# Patient Record
Sex: FEMALE | Race: WHITE | NOT HISPANIC OR LATINO | URBAN - METROPOLITAN AREA
[De-identification: names, ages, dates, MRNs, and addresses within clinical notes are randomized per-mention and may not be internally consistent; named-entity substitution may affect disease eponyms.]

---

## 2020-07-25 ENCOUNTER — TELEPHONE ENCOUNTER (OUTPATIENT)
Dept: URBAN - METROPOLITAN AREA CLINIC 13 | Facility: CLINIC | Age: 60
End: 2020-07-25

## 2020-07-26 ENCOUNTER — TELEPHONE ENCOUNTER (OUTPATIENT)
Dept: URBAN - METROPOLITAN AREA CLINIC 13 | Facility: CLINIC | Age: 60
End: 2020-07-26

## 2020-07-26 RX ORDER — ALPRAZOLAM 0.25 MG/1
TABLET ORAL
Qty: 60 | Refills: 0 | Status: ACTIVE | COMMUNITY
Start: 2018-09-12

## 2020-07-26 RX ORDER — HYDROCODONE BITARTRATE AND ACETAMINOPHEN 5; 325 MG/1; MG/1
TABLET ORAL
Qty: 25 | Refills: 0 | Status: ACTIVE | COMMUNITY
Start: 2018-12-12

## 2020-07-26 RX ORDER — ACETAMINOPHEN AND PHENYLEPHRINE HYDROCHLORIDE 325; 5 MG/1; MG/1
TAKE 1 TABLET DAILY TABLET, COATED ORAL
Refills: 0 | Status: ACTIVE | COMMUNITY

## 2020-07-26 RX ORDER — ALPRAZOLAM 0.25 MG/1
TAKE 1 TABLET DAILY TABLET ORAL
Refills: 0 | Status: ACTIVE | COMMUNITY

## 2020-07-26 RX ORDER — ONDANSETRON 4 MG/1
TABLET, ORALLY DISINTEGRATING ORAL
Qty: 18 | Refills: 0 | Status: ACTIVE | COMMUNITY
Start: 2018-09-09

## 2020-07-26 RX ORDER — CELECOXIB 200 MG/1
TAKE 1 CAPSULE DAILY AS NEEDED CAPSULE ORAL
Refills: 0 | Status: ACTIVE | COMMUNITY

## 2020-07-26 RX ORDER — CELECOXIB 200 MG/1
CAPSULE ORAL
Qty: 90 | Refills: 0 | Status: ACTIVE | COMMUNITY
Start: 2018-09-24

## 2020-07-26 RX ORDER — CITALOPRAM 20 MG/1
TABLET ORAL
Qty: 90 | Refills: 0 | Status: ACTIVE | COMMUNITY
Start: 2019-06-20

## 2020-07-26 RX ORDER — CITALOPRAM 20 MG/1
TAKE 1 TABLET DAILY AS DIRECTED TABLET ORAL
Refills: 0 | Status: ACTIVE | COMMUNITY

## 2020-08-19 ENCOUNTER — OFFICE VISIT (OUTPATIENT)
Dept: URBAN - METROPOLITAN AREA CLINIC 113 | Facility: CLINIC | Age: 60
End: 2020-08-19
Payer: COMMERCIAL

## 2020-08-19 ENCOUNTER — LAB OUTSIDE AN ENCOUNTER (OUTPATIENT)
Dept: URBAN - METROPOLITAN AREA CLINIC 113 | Facility: CLINIC | Age: 60
End: 2020-08-19

## 2020-08-19 ENCOUNTER — WEB ENCOUNTER (OUTPATIENT)
Dept: URBAN - METROPOLITAN AREA CLINIC 113 | Facility: CLINIC | Age: 60
End: 2020-08-19

## 2020-08-19 VITALS
HEIGHT: 72 IN | TEMPERATURE: 98.1 F | WEIGHT: 246 LBS | DIASTOLIC BLOOD PRESSURE: 81 MMHG | RESPIRATION RATE: 18 BRPM | HEART RATE: 60 BPM | BODY MASS INDEX: 33.32 KG/M2 | SYSTOLIC BLOOD PRESSURE: 131 MMHG

## 2020-08-19 DIAGNOSIS — Z12.11 SCREEN FOR COLON CANCER: ICD-10-CM

## 2020-08-19 DIAGNOSIS — K59.01 SLOW TRANSIT CONSTIPATION: ICD-10-CM

## 2020-08-19 DIAGNOSIS — R19.4 CHANGE IN BOWEL HABIT: ICD-10-CM

## 2020-08-19 DIAGNOSIS — R14.0 ABDOMINAL BLOATING: ICD-10-CM

## 2020-08-19 PROCEDURE — 99214 OFFICE O/P EST MOD 30 MIN: CPT | Performed by: INTERNAL MEDICINE

## 2020-08-19 RX ORDER — ONDANSETRON 4 MG/1
TABLET, ORALLY DISINTEGRATING ORAL
Qty: 18 | Refills: 0 | Status: ACTIVE | COMMUNITY
Start: 2018-09-09

## 2020-08-19 RX ORDER — METFORMIN HYDROCHLORIDE 500 MG/1
1 TABLET WITH A MEAL TABLET, FILM COATED ORAL ONCE A DAY
Status: ACTIVE | COMMUNITY

## 2020-08-19 RX ORDER — HYDROCODONE BITARTRATE AND ACETAMINOPHEN 5; 325 MG/1; MG/1
TABLET ORAL
Qty: 25 | Refills: 0 | Status: DISCONTINUED | COMMUNITY
Start: 2018-12-12

## 2020-08-19 RX ORDER — CITALOPRAM 20 MG/1
TAKE 1 TABLET DAILY AS DIRECTED TABLET ORAL
Refills: 0 | Status: ACTIVE | COMMUNITY

## 2020-08-19 RX ORDER — SODIUM, POTASSIUM,MAG SULFATES 17.5-3.13G
354 ML SOLUTION, RECONSTITUTED, ORAL ORAL ONCE
Qty: 10 | Refills: 0 | OUTPATIENT
Start: 2020-08-19 | End: 2020-11-16

## 2020-08-19 RX ORDER — ACETAMINOPHEN AND PHENYLEPHRINE HYDROCHLORIDE 325; 5 MG/1; MG/1
TAKE 1 TABLET DAILY TABLET, COATED ORAL
Refills: 0 | Status: ACTIVE | COMMUNITY

## 2020-08-19 RX ORDER — ALPRAZOLAM 0.25 MG/1
TAKE 1 TABLET DAILY TABLET ORAL
Refills: 0 | Status: ACTIVE | COMMUNITY

## 2020-08-19 RX ORDER — SIMVASTATIN 40 MG/1
1 TABLET IN THE EVENING TABLET, FILM COATED ORAL ONCE A DAY
Status: ACTIVE | COMMUNITY

## 2020-08-19 RX ORDER — OMEPRAZOLE 40 MG/1
1 CAPSULE 30 MINUTES BEFORE MORNING MEAL CAPSULE, DELAYED RELEASE ORAL ONCE A DAY
Status: ACTIVE | COMMUNITY

## 2020-08-19 RX ORDER — CELECOXIB 200 MG/1
CAPSULE ORAL
Qty: 90 | Refills: 0 | Status: DISCONTINUED | COMMUNITY
Start: 2018-09-24

## 2020-08-19 RX ORDER — PLECANATIDE 3 MG/1
1 TABLET TABLET ORAL ONCE A DAY
Qty: 30 | Refills: 5 | OUTPATIENT
Start: 2020-08-19 | End: 2021-02-14

## 2020-08-19 RX ORDER — CELECOXIB 200 MG/1
TAKE 1 CAPSULE DAILY AS NEEDED CAPSULE ORAL
Refills: 0 | Status: ACTIVE | COMMUNITY

## 2020-08-19 NOTE — HPI-TODAY'S VISIT:
Ms. Santos is a 60 year old woman referred by Dr. Oscar for evaluation of acid reflux. She was last seen here as a new patient in August 2019.  She has been on daily PPI therapy for 20-30 years. She used to have voice changes, throat clearing and asthmatic attacks which she attributed to acid reflux. The symptoms have been quiescent on PPI therapy. She started having choking spells, reflux and heartburn in March or April of 2019.  These were episodic, lasting about 3 minutes each.    After her last visit, the patient had an upper GI endoscopy performed on September 19, 2019.  This revealed gastritis and gastric polyps.  The biopsies were negative for any significant pathology.  There are also negative for H. pylori.  Patient also had a barium swallow performed on September 17 2019 which was normal.      Previous studies EGD 2009 : Z-line at 36 cm, esophageal changes possibly Parson's (no intestinal metaplasia on bx), gastric biopsies negative for H pylori, and mild duodenitis. Bravo pH probe was qualitative positive demonstrating only postprandial reflux. Colonoscopy 2010 : good prep, normal TI, moderate nonbleeding internal hemorrhoids, normal perianal and digital rectal exams. Labs 5/10/19 : WBC 6.2, Hgb 12.9, MCV 91, plt 223, BUN 16, Cr 0.89, TB 0.5, ALP 70, AST 17, ALT 20, alb 4.3. Negative HCV Ab. After last visit, the patient had an upper GI endoscopy performed on September 19, 2019.  This revealed gastritis and gastric polyps.  The biopsies were negative for any significant pathology.  There are also negative for H. pylori.  Patient also had a barium swallow performed on September 17 2019 which was normal.

## 2020-08-24 ENCOUNTER — OFFICE VISIT (OUTPATIENT)
Dept: URBAN - METROPOLITAN AREA SURGERY CENTER 25 | Facility: SURGERY CENTER | Age: 60
End: 2020-08-24
Payer: COMMERCIAL

## 2020-08-24 DIAGNOSIS — Z12.11 COLON CANCER SCREENING: ICD-10-CM

## 2020-08-24 DIAGNOSIS — K64.0 FIRST DEGREE HEMORRHOIDS: ICD-10-CM

## 2020-08-24 DIAGNOSIS — K57.30 COLON, DIVERTICULOSIS: ICD-10-CM

## 2020-08-24 PROCEDURE — G0121 COLON CA SCRN NOT HI RSK IND: HCPCS | Performed by: INTERNAL MEDICINE

## 2020-08-24 RX ORDER — METFORMIN HYDROCHLORIDE 500 MG/1
1 TABLET WITH A MEAL TABLET, FILM COATED ORAL ONCE A DAY
Status: ACTIVE | COMMUNITY

## 2020-08-24 RX ORDER — ACETAMINOPHEN AND PHENYLEPHRINE HYDROCHLORIDE 325; 5 MG/1; MG/1
TAKE 1 TABLET DAILY TABLET, COATED ORAL
Refills: 0 | Status: ACTIVE | COMMUNITY

## 2020-08-24 RX ORDER — CITALOPRAM 20 MG/1
TAKE 1 TABLET DAILY AS DIRECTED TABLET ORAL
Refills: 0 | Status: ACTIVE | COMMUNITY

## 2020-08-24 RX ORDER — OMEPRAZOLE 40 MG/1
1 CAPSULE 30 MINUTES BEFORE MORNING MEAL CAPSULE, DELAYED RELEASE ORAL ONCE A DAY
Status: ACTIVE | COMMUNITY

## 2020-08-24 RX ORDER — SODIUM, POTASSIUM,MAG SULFATES 17.5-3.13G
354 ML SOLUTION, RECONSTITUTED, ORAL ORAL ONCE
Qty: 10 | Refills: 0 | Status: ACTIVE | COMMUNITY
Start: 2020-08-19 | End: 2020-11-16

## 2020-08-24 RX ORDER — ONDANSETRON 4 MG/1
TABLET, ORALLY DISINTEGRATING ORAL
Qty: 18 | Refills: 0 | Status: ACTIVE | COMMUNITY
Start: 2018-09-09

## 2020-08-24 RX ORDER — PLECANATIDE 3 MG/1
1 TABLET TABLET ORAL ONCE A DAY
Qty: 30 | Refills: 5 | Status: ACTIVE | COMMUNITY
Start: 2020-08-19 | End: 2021-02-14

## 2020-08-24 RX ORDER — SIMVASTATIN 40 MG/1
1 TABLET IN THE EVENING TABLET, FILM COATED ORAL ONCE A DAY
Status: ACTIVE | COMMUNITY

## 2020-08-24 RX ORDER — ALPRAZOLAM 0.25 MG/1
TAKE 1 TABLET DAILY TABLET ORAL
Refills: 0 | Status: ACTIVE | COMMUNITY

## 2020-08-24 RX ORDER — CELECOXIB 200 MG/1
TAKE 1 CAPSULE DAILY AS NEEDED CAPSULE ORAL
Refills: 0 | Status: ACTIVE | COMMUNITY

## 2020-09-01 ENCOUNTER — TELEPHONE ENCOUNTER (OUTPATIENT)
Dept: URBAN - METROPOLITAN AREA CLINIC 113 | Facility: CLINIC | Age: 60
End: 2020-09-01

## 2020-09-01 ENCOUNTER — WEB ENCOUNTER (OUTPATIENT)
Dept: URBAN - METROPOLITAN AREA CLINIC 113 | Facility: CLINIC | Age: 60
End: 2020-09-01

## 2020-09-01 RX ORDER — RIFAXIMIN 550 MG/1
1 TABLET TABLET ORAL TWICE A DAY
Qty: 60 TABLET | Refills: 2 | OUTPATIENT
Start: 2020-09-01 | End: 2020-11-29

## 2020-09-02 ENCOUNTER — WEB ENCOUNTER (OUTPATIENT)
Dept: URBAN - METROPOLITAN AREA SURGERY CENTER 25 | Facility: SURGERY CENTER | Age: 60
End: 2020-09-02

## 2020-11-04 ENCOUNTER — ERX REFILL RESPONSE (OUTPATIENT)
Age: 60
End: 2020-11-04

## 2020-11-04 RX ORDER — RIFAXIMIN 550 MG/1
TAKE 1 TABLET BY MOUTH TWICE DAILY TABLET ORAL
Qty: 60 | Refills: 0

## 2020-11-19 ENCOUNTER — OFFICE VISIT (OUTPATIENT)
Dept: URBAN - METROPOLITAN AREA CLINIC 113 | Facility: CLINIC | Age: 60
End: 2020-11-19
Payer: COMMERCIAL

## 2020-11-19 VITALS
BODY MASS INDEX: 33.86 KG/M2 | SYSTOLIC BLOOD PRESSURE: 130 MMHG | WEIGHT: 250 LBS | HEART RATE: 64 BPM | TEMPERATURE: 97.9 F | HEIGHT: 72 IN | DIASTOLIC BLOOD PRESSURE: 80 MMHG | RESPIRATION RATE: 18 BRPM

## 2020-11-19 DIAGNOSIS — R19.4 CHANGE IN BOWEL HABIT: ICD-10-CM

## 2020-11-19 DIAGNOSIS — K59.01 SLOW TRANSIT CONSTIPATION: ICD-10-CM

## 2020-11-19 DIAGNOSIS — K21.9 GASTROESOPHAGEAL REFLUX DISEASE, UNSPECIFIED WHETHER ESOPHAGITIS PRESENT: ICD-10-CM

## 2020-11-19 DIAGNOSIS — R14.0 ABDOMINAL BLOATING: ICD-10-CM

## 2020-11-19 DIAGNOSIS — Z12.11 SCREEN FOR COLON CANCER: ICD-10-CM

## 2020-11-19 PROCEDURE — G8482 FLU IMMUNIZE ORDER/ADMIN: HCPCS | Performed by: INTERNAL MEDICINE

## 2020-11-19 PROCEDURE — 3017F COLORECTAL CA SCREEN DOC REV: CPT | Performed by: INTERNAL MEDICINE

## 2020-11-19 PROCEDURE — 1036F TOBACCO NON-USER: CPT | Performed by: INTERNAL MEDICINE

## 2020-11-19 PROCEDURE — G8417 CALC BMI ABV UP PARAM F/U: HCPCS | Performed by: INTERNAL MEDICINE

## 2020-11-19 PROCEDURE — 99214 OFFICE O/P EST MOD 30 MIN: CPT | Performed by: INTERNAL MEDICINE

## 2020-11-19 RX ORDER — HYOSCYAMINE SULFATE 0.12 MG/1
1 TABLET UNDER THE TONGUE AND ALLOW TO DISSOLVE  AS NEEDED TABLET, ORALLY DISINTEGRATING ORAL THREE TIMES A DAY
Qty: 90 | Refills: 3 | OUTPATIENT

## 2020-11-19 RX ORDER — CHOLECALCIFEROL (VITAMIN D3) 125 MCG
2 CAPSULE CAPSULE ORAL ONCE A DAY
Status: ACTIVE | COMMUNITY

## 2020-11-19 RX ORDER — ALPRAZOLAM 0.25 MG/1
TAKE 1 TABLET DAILY TABLET ORAL
Refills: 0 | Status: ACTIVE | COMMUNITY

## 2020-11-19 RX ORDER — CITALOPRAM 20 MG/1
TAKE 1 TABLET DAILY AS DIRECTED TABLET ORAL
Refills: 0 | Status: ACTIVE | COMMUNITY

## 2020-11-19 RX ORDER — ACETAMINOPHEN AND PHENYLEPHRINE HYDROCHLORIDE 325; 5 MG/1; MG/1
TAKE 1 TABLET DAILY TABLET, COATED ORAL
Refills: 0 | Status: ACTIVE | COMMUNITY

## 2020-11-19 RX ORDER — OMEPRAZOLE 40 MG/1
1 CAPSULE 30 MINUTES BEFORE MORNING MEAL CAPSULE, DELAYED RELEASE ORAL ONCE A DAY
Status: ACTIVE | COMMUNITY

## 2020-11-19 RX ORDER — SIMVASTATIN 40 MG/1
1 TABLET IN THE EVENING TABLET, FILM COATED ORAL ONCE A DAY
Status: ACTIVE | COMMUNITY

## 2020-11-19 RX ORDER — RIFAXIMIN 550 MG/1
TAKE 1 TABLET BY MOUTH TWICE DAILY TABLET ORAL
Qty: 60 | Refills: 0 | Status: ACTIVE | COMMUNITY

## 2020-11-19 RX ORDER — CELECOXIB 200 MG/1
TAKE 1 CAPSULE DAILY AS NEEDED CAPSULE ORAL
Refills: 0 | Status: ACTIVE | COMMUNITY

## 2020-11-19 RX ORDER — PLECANATIDE 3 MG/1
1 TABLET TABLET ORAL ONCE A DAY
Qty: 30 | Refills: 5 | OUTPATIENT

## 2020-11-19 RX ORDER — METFORMIN HYDROCHLORIDE 500 MG/1
1 TABLET WITH A MEAL TABLET, FILM COATED ORAL ONCE A DAY
Status: ACTIVE | COMMUNITY

## 2020-11-19 NOTE — HPI-TODAY'S VISIT:
Ms. Santos is a 60 year old woman referred by Dr. Oscar for evaluation of acid reflux. She was seen here as a "new patient" in August 2019; her last visit here was on 8/19/20.  She has been on daily PPI therapy for 20-30 years. She used to have voice changes, throat clearing and asthmatic attacks which she attributed to acid reflux. The symptoms have been quiescent on PPI therapy. She started having choking spells, reflux and heartburn in March or April of 2019.  These were episodic, lasting about 3 minutes each.    The patient had a barium swallow performed on September 17 2019 which was normal.  She also had an upper GI endoscopy performed on September 19, 2019 which revealed gastritis and gastric polyps.  The biopsies were negative for any significant pathology, including H. pylori.   After her last visit, she was scheduled for a CT scan of the abdomen and pelvis to exclude an inflammatory process such as diverticulitis and begun on Trulance for her change in bowel habits (constipation) .    She was also scheduled for colonoscopy.  This was done on 8/24/20.  The patient's colonoscopy showed diverticulosis and internal hemorrhoids but was otherwise negative.  CT scan on September 1, 2020 showed mild left colonic diverticulosis without diverticulitis, no proctitis, no colitis, no ascites, a fat-containing small periumbilical ventral hernia without findings of hernia strangulation, and no other acute pathology.  The gallbladder is noted to be surgically absent.  She was noted to have some generalized degenerative changes in the L4-L5 areas of the spine.  Notably, the anatomic configuration of the liver raise consideration for chronic hepatocellular disease without specific CT findings of cirrhosis or portal hypertension.  The patient returns today with a number of vague complaints.  She found that Trulance did help her to some degree with her constipation, with a transition from 4 bowel movements a week to 4 bowel movements per day.  Her stools are tending to be somewhat loose.  She describes a "churning sensation" in her abdomen.  She did try taking Xifaxan twice daily, which seems to help her but she notes increased borborygmi on this.  Notably, the abdominal pressure she felt before is gone.  She is not having abdominal pain, although she does note increased bowel sounds recently.  She is noted increased stress at work which may be contributing to the symptoms.  Overall, she is somewhat better but is not at baseline.  Her reflux symptoms are controlled on twice daily PPI therapy and she is not interested in pursuing further work-up or intervention for this at this time.  Notably, the patient was recently diagnosed as having obstructive sleep apnea and was begun on nasal CPAP.  She finds that she is "not hungry" but she is gaining weight.   Previous studies:  EGD 2009 : Z-line at 36 cm, esophageal changes possibly Parson's (no intestinal metaplasia on bx), gastric biopsies negative for H pylori, and mild duodenitis. Bravo pH probe was qualitative positive demonstrating only postprandial reflux.  Colonoscopy 2010 : good prep, normal TI, moderate nonbleeding internal hemorrhoids, normal perianal and digital rectal exams.  Labs 5/10/19 : WBC 6.2, Hgb 12.9, MCV 91, plt 223, BUN 16, Cr 0.89, TB 0.5, ALP 70, AST 17, ALT 20, alb 4.3. Negative HCV Ab.

## 2020-12-07 ENCOUNTER — TELEPHONE ENCOUNTER (OUTPATIENT)
Dept: URBAN - METROPOLITAN AREA CLINIC 113 | Facility: CLINIC | Age: 60
End: 2020-12-07

## 2020-12-07 RX ORDER — RIFAXIMIN 550 MG/1
TAKE 1 TABLET BY MOUTH TWICE DAILY TABLET ORAL TWICE A DAY
Qty: 60 | Refills: 3

## 2021-02-22 ENCOUNTER — OFFICE VISIT (OUTPATIENT)
Dept: URBAN - METROPOLITAN AREA CLINIC 113 | Facility: CLINIC | Age: 61
End: 2021-02-22
Payer: COMMERCIAL

## 2021-02-22 VITALS
SYSTOLIC BLOOD PRESSURE: 149 MMHG | HEIGHT: 72 IN | WEIGHT: 255 LBS | TEMPERATURE: 98.9 F | BODY MASS INDEX: 34.54 KG/M2 | RESPIRATION RATE: 20 BRPM | DIASTOLIC BLOOD PRESSURE: 85 MMHG | HEART RATE: 65 BPM

## 2021-02-22 DIAGNOSIS — Z12.11 SCREEN FOR COLON CANCER: ICD-10-CM

## 2021-02-22 DIAGNOSIS — K59.01 SLOW TRANSIT CONSTIPATION: ICD-10-CM

## 2021-02-22 DIAGNOSIS — R19.4 CHANGE IN BOWEL HABIT: ICD-10-CM

## 2021-02-22 DIAGNOSIS — R14.0 ABDOMINAL BLOATING: ICD-10-CM

## 2021-02-22 DIAGNOSIS — K21.9 GASTROESOPHAGEAL REFLUX DISEASE, UNSPECIFIED WHETHER ESOPHAGITIS PRESENT: ICD-10-CM

## 2021-02-22 PROBLEM — 35298007: Status: ACTIVE | Noted: 2020-08-19

## 2021-02-22 PROCEDURE — 99214 OFFICE O/P EST MOD 30 MIN: CPT | Performed by: INTERNAL MEDICINE

## 2021-02-22 RX ORDER — HYOSCYAMINE SULFATE 0.12 MG/1
1 TABLET UNDER THE TONGUE AND ALLOW TO DISSOLVE  AS NEEDED TABLET, ORALLY DISINTEGRATING ORAL THREE TIMES A DAY
Qty: 90 | Refills: 3 | Status: DISCONTINUED | COMMUNITY

## 2021-02-22 RX ORDER — RIFAXIMIN 550 MG/1
TAKE 1 TABLET BY MOUTH TWICE DAILY TABLET ORAL TWICE A DAY
Qty: 60 | Refills: 3 | Status: DISCONTINUED | COMMUNITY

## 2021-02-22 RX ORDER — CELECOXIB 200 MG/1
TAKE 1 CAPSULE DAILY AS NEEDED CAPSULE ORAL
Refills: 0 | Status: ACTIVE | COMMUNITY

## 2021-02-22 RX ORDER — CHOLECALCIFEROL (VITAMIN D3) 125 MCG
2 TABLET CAPSULE ORAL ONCE A DAY
Status: ACTIVE | COMMUNITY

## 2021-02-22 RX ORDER — SIMVASTATIN 40 MG/1
1 TABLET IN THE EVENING TABLET, FILM COATED ORAL ONCE A DAY
Status: ACTIVE | COMMUNITY

## 2021-02-22 RX ORDER — OMEPRAZOLE 40 MG/1
1 CAPSULE 30 MINUTES BEFORE MORNING MEAL CAPSULE, DELAYED RELEASE ORAL ONCE A DAY
Status: ACTIVE | COMMUNITY

## 2021-02-22 RX ORDER — DOCUSATE SODIUM 100 MG/1
1 CAPSULE AS NEEDED CAPSULE, LIQUID FILLED ORAL ONCE A DAY
Status: ACTIVE | COMMUNITY

## 2021-02-22 RX ORDER — METFORMIN HYDROCHLORIDE 500 MG/1
1 TABLET WITH A MEAL TABLET, FILM COATED ORAL ONCE A DAY
Status: ACTIVE | COMMUNITY

## 2021-02-22 RX ORDER — VORTIOXETINE 20 MG/1
1 TABLET TABLET, FILM COATED ORAL ONCE A DAY
Status: ACTIVE | COMMUNITY

## 2021-02-22 RX ORDER — ALPRAZOLAM 0.25 MG/1
TAKE 1 TABLET DAILY TABLET ORAL
Refills: 0 | Status: DISCONTINUED | COMMUNITY

## 2021-02-22 RX ORDER — PLECANATIDE 3 MG/1
1 TABLET TABLET ORAL ONCE A DAY
Qty: 30 | Refills: 5 | Status: DISCONTINUED | COMMUNITY

## 2021-02-22 RX ORDER — POLYETHYLENE GLYCOL 3350 17 G/17G
AS DIRECTED POWDER, FOR SOLUTION ORAL ONCE A DAY
Status: ACTIVE | COMMUNITY

## 2021-02-22 RX ORDER — BISACODYL 5 MG/1
3 TABLET AS NEEDED TABLET, DELAYED RELEASE ORAL
Status: ACTIVE | COMMUNITY

## 2021-02-22 RX ORDER — CITALOPRAM 20 MG/1
TAKE 1 TABLET DAILY AS DIRECTED TABLET ORAL
Refills: 0 | Status: DISCONTINUED | COMMUNITY

## 2021-02-22 RX ORDER — ACETAMINOPHEN AND PHENYLEPHRINE HYDROCHLORIDE 325; 5 MG/1; MG/1
TAKE 1 TABLET DAILY TABLET, COATED ORAL
Refills: 0 | Status: ACTIVE | COMMUNITY

## 2021-02-22 NOTE — HPI-TODAY'S VISIT:
Ms. Santos is a 60 year old woman referred by Dr. Oscar for evaluation of acid reflux. She was seen here as a "new patient" in August 2019; her last visit here was on 8/19/20.   She has been on daily PPI therapy for 20-30 years. She used to have voice changes, throat clearing and asthmatic attacks which she attributed to acid reflux. The symptoms have been quiescent on PPI therapy. She started having choking spells, reflux and heartburn in March or April of 2019.  These were episodic, lasting about 3 minutes each.    The patient had a barium swallow performed on September 17 2019 which was normal.  She had an upper GI endoscopy performed on September 19, 2019 which revealed gastritis and gastric polyps.  The biopsies were negative for any significant pathology, including H. pylori.    After her last visit, she was scheduled for a CT scan of the abdomen and pelvis to exclude an inflammatory process such as diverticulitis and begun on Trulance for her change in bowel habits (constipation) .   She was also scheduled for colonoscopy.  This was done on 8/24/20.  The patient's colonoscopy showed diverticulosis and internal hemorrhoids but was otherwise negative.  CT scan on September 1, 2020 showed mild left colonic diverticulosis without diverticulitis, no proctitis, no colitis, no ascites, a fat-containing small periumbilical ventral hernia without findings of hernia strangulation, and no other acute pathology.  The gallbladder was noted to be surgically absent.  She was noted to have some generalized degenerative changes in the L4-L5 areas of the spine.  Notably, the anatomic configuration of the liver raise consideration for chronic hepatocellular disease without specific CT findings of cirrhosis or portal hypertension.  At the time of her last visit on November 19, 2020, the patient had a number of vague complaints.   She had found that Trulance helped her to some degree with her constipation, with a transition from 4 bowel movements a week to 4 bowel movements per day.  Her stools tended to be somewhat loose.  She described.  We have a "churning sensation" in her abdomen.  She did try taking Xifaxan twice daily, which seemed to help her, but she noted increased borborygmi on this.  Notably, the abdominal pressure she felt before was gone.  She is not having abdominal pain, although she does note increased bowel sounds recently.  She has noted increased stress at work which may be contributing to the symptoms.  Overall, she was somewhat better but is not at baseline.  Her reflux symptoms were controlled on twice daily PPI therapy and she is not interested in pursuing further work-up or intervention for this at this time.  Notably, the patient was recently diagnosed as having obstructive sleep apnea and was begun on nasal CPAP.  She finds that she is "not hungry" but she is gaining weight.   Today, the patient returns having discontinued Xifaxan because of excessive borborygmi.  Her reflux symptoms are controlled on twice a day PPI therapy.  She is using MiraLAX daily, and has decided to use the Trulance an as-needed basis.  She has nausea, which is constant, but denies emesis.  She feels better from her nausea standpoint after she has a bowel movement.  She denies any abdominal pain.  She also discontinued hyoscyamine, which she felt was causing headache.  There is some concern that metformin may be contributing to her nausea.  Notably, she is on Trentellix, and anxiolytics/antidepressant, which seems to be helping with her overall demeanor and abdominal pain.  Her weight has increased an additional 5 pounds since her last visit.  Previous studies:  EGD 2009 : Z-line at 36 cm, esophageal changes possibly Parson's (no intestinal metaplasia on bx), gastric biopsies negative for H pylori, and mild duodenitis. Bravo pH probe was qualitative positive demonstrating only postprandial reflux.  Colonoscopy 2010 : good prep, normal TI, moderate nonbleeding internal hemorrhoids, normal perianal and digital rectal exams.  Labs 5/10/19 : WBC 6.2, Hgb 12.9, MCV 91, plt 223, BUN 16, Cr 0.89, TB 0.5, ALP 70, AST 17, ALT 20, alb 4.3. Negative HCV Ab.

## 2021-02-24 ENCOUNTER — DASHBOARD ENCOUNTERS (OUTPATIENT)
Age: 61
End: 2021-02-24

## 2021-03-04 ENCOUNTER — TELEPHONE ENCOUNTER (OUTPATIENT)
Dept: URBAN - METROPOLITAN AREA CLINIC 113 | Facility: CLINIC | Age: 61
End: 2021-03-04

## 2022-08-30 ENCOUNTER — OFFICE VISIT (OUTPATIENT)
Dept: URBAN - METROPOLITAN AREA CLINIC 107 | Facility: CLINIC | Age: 62
End: 2022-08-30

## 2023-03-14 ENCOUNTER — TELEPHONE ENCOUNTER (OUTPATIENT)
Dept: URBAN - METROPOLITAN AREA CLINIC 113 | Facility: CLINIC | Age: 63
End: 2023-03-14

## 2025-08-19 ENCOUNTER — P2P PATIENT RECORD (OUTPATIENT)
Age: 65
End: 2025-08-19

## 2025-08-20 ENCOUNTER — TELEPHONE ENCOUNTER (OUTPATIENT)
Dept: URBAN - METROPOLITAN AREA CLINIC 6 | Facility: CLINIC | Age: 65
End: 2025-08-20